# Patient Record
Sex: MALE | Race: WHITE | NOT HISPANIC OR LATINO
[De-identification: names, ages, dates, MRNs, and addresses within clinical notes are randomized per-mention and may not be internally consistent; named-entity substitution may affect disease eponyms.]

---

## 2018-09-04 PROBLEM — Z00.00 ENCOUNTER FOR PREVENTIVE HEALTH EXAMINATION: Status: ACTIVE | Noted: 2018-09-04

## 2018-10-17 ENCOUNTER — RX RENEWAL (OUTPATIENT)
Age: 53
End: 2018-10-17

## 2018-10-18 ENCOUNTER — RX RENEWAL (OUTPATIENT)
Age: 53
End: 2018-10-18

## 2018-12-07 ENCOUNTER — RECORD ABSTRACTING (OUTPATIENT)
Age: 53
End: 2018-12-07

## 2018-12-07 DIAGNOSIS — Z80.41 FAMILY HISTORY OF MALIGNANT NEOPLASM OF OVARY: ICD-10-CM

## 2018-12-07 DIAGNOSIS — Z83.49 FAMILY HISTORY OF OTHER ENDOCRINE, NUTRITIONAL AND METABOLIC DISEASES: ICD-10-CM

## 2018-12-07 DIAGNOSIS — Z83.3 FAMILY HISTORY OF DIABETES MELLITUS: ICD-10-CM

## 2018-12-07 DIAGNOSIS — Z80.0 FAMILY HISTORY OF MALIGNANT NEOPLASM OF DIGESTIVE ORGANS: ICD-10-CM

## 2018-12-07 DIAGNOSIS — Z78.9 OTHER SPECIFIED HEALTH STATUS: ICD-10-CM

## 2018-12-10 ENCOUNTER — APPOINTMENT (OUTPATIENT)
Dept: ENDOCRINOLOGY | Facility: CLINIC | Age: 53
End: 2018-12-10
Payer: COMMERCIAL

## 2018-12-10 VITALS
WEIGHT: 230 LBS | HEIGHT: 60 IN | BODY MASS INDEX: 45.16 KG/M2 | SYSTOLIC BLOOD PRESSURE: 124 MMHG | HEART RATE: 74 BPM | DIASTOLIC BLOOD PRESSURE: 82 MMHG

## 2018-12-10 DIAGNOSIS — Z86.79 PERSONAL HISTORY OF OTHER DISEASES OF THE CIRCULATORY SYSTEM: ICD-10-CM

## 2018-12-10 DIAGNOSIS — Z87.891 PERSONAL HISTORY OF NICOTINE DEPENDENCE: ICD-10-CM

## 2018-12-10 PROCEDURE — 99213 OFFICE O/P EST LOW 20 MIN: CPT

## 2018-12-10 RX ORDER — MULTIVITAMIN
TABLET ORAL DAILY
Refills: 0 | Status: ACTIVE | COMMUNITY

## 2019-02-05 ENCOUNTER — APPOINTMENT (OUTPATIENT)
Dept: ENDOCRINOLOGY | Facility: CLINIC | Age: 54
End: 2019-02-05

## 2019-06-10 ENCOUNTER — APPOINTMENT (OUTPATIENT)
Dept: ENDOCRINOLOGY | Facility: CLINIC | Age: 54
End: 2019-06-10
Payer: COMMERCIAL

## 2019-06-10 ENCOUNTER — TRANSCRIPTION ENCOUNTER (OUTPATIENT)
Age: 54
End: 2019-06-10

## 2019-06-10 VITALS
HEIGHT: 68 IN | OXYGEN SATURATION: 97 % | WEIGHT: 230 LBS | HEART RATE: 68 BPM | SYSTOLIC BLOOD PRESSURE: 122 MMHG | DIASTOLIC BLOOD PRESSURE: 80 MMHG | BODY MASS INDEX: 34.86 KG/M2

## 2019-06-10 PROCEDURE — 99213 OFFICE O/P EST LOW 20 MIN: CPT

## 2019-11-26 ENCOUNTER — APPOINTMENT (OUTPATIENT)
Dept: ENDOCRINOLOGY | Facility: CLINIC | Age: 54
End: 2019-11-26
Payer: COMMERCIAL

## 2019-11-26 PROCEDURE — 36415 COLL VENOUS BLD VENIPUNCTURE: CPT

## 2019-11-27 LAB
T4 FREE SERPL-MCNC: 1.5 NG/DL
TSH SERPL-ACNC: 3.47 UIU/ML

## 2019-12-02 LAB
THYROGLOB AB SERPL-ACNC: 47.4 IU/ML
THYROPEROXIDASE AB SERPL IA-ACNC: 3133 IU/ML

## 2019-12-03 ENCOUNTER — APPOINTMENT (OUTPATIENT)
Dept: ENDOCRINOLOGY | Facility: CLINIC | Age: 54
End: 2019-12-03
Payer: COMMERCIAL

## 2019-12-03 VITALS
DIASTOLIC BLOOD PRESSURE: 76 MMHG | SYSTOLIC BLOOD PRESSURE: 120 MMHG | HEIGHT: 68 IN | WEIGHT: 231 LBS | HEART RATE: 60 BPM | BODY MASS INDEX: 35.01 KG/M2

## 2019-12-03 PROCEDURE — 99213 OFFICE O/P EST LOW 20 MIN: CPT

## 2019-12-03 NOTE — REVIEW OF SYSTEMS
[Recent Weight Gain (___ Lbs)] : recent [unfilled] ~Ulb weight gain [Fatigue] : fatigue [As Noted in HPI] : as noted in HPI [Myalgia] : myalgia  [Palpitations] : no palpitations [Heart Rate Is Fast] : the heart rate was not fast [Constipation] : no constipation [Joint Pain] : no joint pain [Diarrhea] : no diarrhea [Tremors] : no tremors [Depression] : no depression [Anxiety] : no anxiety [Stress] : no stress [Cold Intolerance] : cold tolerant [Heat Intolerance] : heat tolerant

## 2019-12-03 NOTE — HISTORY OF PRESENT ILLNESS
[FreeTextEntry1] : Quality: Hashimoto's Hypothyroid and thyroid nodule\par Duration: 2014\par Onset: routine blood test and sonogram\par Severity: mild\par Associated symptoms: denies anterior neck pain, dysphagia or voice changes.\par \par MODIFYING FACTORS: \par Current thyroid medication: Synthroid (TAVIA) 150 mcg daily on empty stomach\par \par

## 2019-12-03 NOTE — DATA REVIEWED
[FreeTextEntry1] : Thyroid sono 2017 - diffusely heterogeneous thyroid with Right 9x5x6 mm solid nodule - stable since 2014\par Thyroid sono 11/22/19 - diffusely heterogeneous, normal size, no thyroid nodules\par

## 2019-12-03 NOTE — PHYSICAL EXAM
[Well Nourished] : well nourished [Alert] : alert [Well Developed] : well developed [Normal Sclera/Conjunctiva] : normal sclera/conjunctiva [EOMI] : extra ocular movement intact [No LAD] : no lymphadenopathy [Thyroid Not Enlarged] : the thyroid was not enlarged [No Thyroid Nodules] : there were no palpable thyroid nodules [Normal Rate] : heart rate was normal  [No Rash] : no rash [Normal S1, S2] : normal S1 and S2 [Normal Reflexes] : deep tendon reflexes were 2+ and symmetric [No Tremors] : no tremors [Normal Insight/Judgement] : insight and judgment were intact [Oriented x3] : oriented to person, place, and time [Normal Affect] : the affect was normal [Normal Mood] : the mood was normal

## 2019-12-03 NOTE — ASSESSMENT
[FreeTextEntry1] : 1. Hypothyroid - clinically and biochemically euthyroid\par -  cont current Synthroid dose\par - repeat TFTs 8 weeks to monitor TFTs as pt feels that thyrod levels may be trending in hypothyroid range\par \par 2. thyroid nodule -  thyroid nodule not seen on recent thyroid sonogram

## 2020-02-04 ENCOUNTER — APPOINTMENT (OUTPATIENT)
Dept: ENDOCRINOLOGY | Facility: CLINIC | Age: 55
End: 2020-02-04
Payer: COMMERCIAL

## 2020-02-04 PROCEDURE — 36415 COLL VENOUS BLD VENIPUNCTURE: CPT

## 2020-02-05 LAB
T4 FREE SERPL-MCNC: 1.1 NG/DL
TSH SERPL-ACNC: 5.71 UIU/ML

## 2020-05-18 ENCOUNTER — APPOINTMENT (OUTPATIENT)
Dept: ENDOCRINOLOGY | Facility: CLINIC | Age: 55
End: 2020-05-18

## 2020-06-01 ENCOUNTER — APPOINTMENT (OUTPATIENT)
Dept: ENDOCRINOLOGY | Facility: CLINIC | Age: 55
End: 2020-06-01
Payer: COMMERCIAL

## 2020-06-01 PROCEDURE — 36415 COLL VENOUS BLD VENIPUNCTURE: CPT

## 2020-06-02 ENCOUNTER — APPOINTMENT (OUTPATIENT)
Dept: ENDOCRINOLOGY | Facility: CLINIC | Age: 55
End: 2020-06-02
Payer: COMMERCIAL

## 2020-06-02 VITALS
HEART RATE: 54 BPM | HEIGHT: 68 IN | BODY MASS INDEX: 32.58 KG/M2 | SYSTOLIC BLOOD PRESSURE: 118 MMHG | WEIGHT: 215 LBS | DIASTOLIC BLOOD PRESSURE: 60 MMHG

## 2020-06-02 DIAGNOSIS — E04.1 NONTOXIC SINGLE THYROID NODULE: ICD-10-CM

## 2020-06-02 LAB
T4 FREE SERPL-MCNC: 1.7 NG/DL
TSH SERPL-ACNC: 1.22 UIU/ML

## 2020-06-02 PROCEDURE — 99213 OFFICE O/P EST LOW 20 MIN: CPT

## 2020-06-02 RX ORDER — CHLORHEXIDINE GLUCONATE 1.2 MG/ML
RINSE ORAL
Refills: 0 | Status: DISCONTINUED | COMMUNITY
End: 2020-06-02

## 2020-06-02 NOTE — HISTORY OF PRESENT ILLNESS
[FreeTextEntry1] : Quality: Hashimoto's Hypothyroid and thyroid nodule\par Duration: 2014\par Onset: routine blood test and sonogram\par Severity: mild\par Associated symptoms: denies anterior neck pain, dysphagia or voice changes.\par \par MODIFYING FACTORS: \par Current thyroid medication: Synthroid (TAVIA) 175 mcg daily on empty stomach, missed 3x since last visit\par \par

## 2020-06-02 NOTE — ASSESSMENT
[FreeTextEntry1] : 1. Hypothyroid - clinically and biochemically euthyroid\par -  cont Synthroid 175 mcg daily\par - repeat  TFTs 1 week before next visit\par \par \par

## 2020-06-02 NOTE — REVIEW OF SYSTEMS
[Constipation] : no constipation [Palpitations] : no palpitations [Diarrhea] : no diarrhea [Tremors] : no tremors [Heat Intolerance] : no heat intolerance [Cold Intolerance] : no cold intolerance

## 2020-06-02 NOTE — PHYSICAL EXAM
[Well Nourished] : well nourished [Alert] : alert [Healthy Appearance] : healthy appearance [No Acute Distress] : no acute distress [Normal Sclera/Conjunctiva] : normal sclera/conjunctiva [Normal Voice/Communication] : normal voice communication [EOMI] : extra ocular movement intact [No LAD] : no lymphadenopathy [No Thyroid Nodules] : no palpable thyroid nodules [No Respiratory Distress] : no respiratory distress [No Accessory Muscle Use] : no accessory muscle use [Clear to Auscultation] : lungs were clear to auscultation bilaterally [Normal S1, S2] : normal S1 and S2 [Normal Gait] : normal gait [Oriented x3] : oriented to person, place, and time [No Tremors] : no tremors [Normal Mood] : the mood was normal [Normal Insight/Judgement] : insight and judgment were intact [Normal Affect] : the affect was normal

## 2020-11-25 ENCOUNTER — APPOINTMENT (OUTPATIENT)
Dept: ENDOCRINOLOGY | Facility: CLINIC | Age: 55
End: 2020-11-25

## 2020-12-02 ENCOUNTER — APPOINTMENT (OUTPATIENT)
Dept: ENDOCRINOLOGY | Facility: CLINIC | Age: 55
End: 2020-12-02
Payer: COMMERCIAL

## 2020-12-02 VITALS
BODY MASS INDEX: 31.83 KG/M2 | SYSTOLIC BLOOD PRESSURE: 110 MMHG | OXYGEN SATURATION: 98 % | HEIGHT: 68 IN | WEIGHT: 210 LBS | DIASTOLIC BLOOD PRESSURE: 70 MMHG | HEART RATE: 58 BPM

## 2020-12-02 PROCEDURE — 99213 OFFICE O/P EST LOW 20 MIN: CPT

## 2020-12-02 PROCEDURE — 99072 ADDL SUPL MATRL&STAF TM PHE: CPT

## 2020-12-02 NOTE — ASSESSMENT
[FreeTextEntry1] : 1. Hypothyroid - clinically euthyroid\par -  cont Synthroid 175 mcg daily\par - repeat  TFTs this week and 1 week before next visit; will adjust synthroid dose if needed\par \par \par

## 2020-12-02 NOTE — PHYSICAL EXAM
[Alert] : alert [Well Nourished] : well nourished [Healthy Appearance] : healthy appearance [No Acute Distress] : no acute distress [Normal Voice/Communication] : normal voice communication [Normal Sclera/Conjunctiva] : normal sclera/conjunctiva [EOMI] : extra ocular movement intact [No LAD] : no lymphadenopathy [No Thyroid Nodules] : no palpable thyroid nodules [No Respiratory Distress] : no respiratory distress [No Accessory Muscle Use] : no accessory muscle use [Clear to Auscultation] : lungs were clear to auscultation bilaterally [Normal S1, S2] : normal S1 and S2 [Normal Gait] : normal gait [No Tremors] : no tremors [Oriented x3] : oriented to person, place, and time [Normal Affect] : the affect was normal [Normal Insight/Judgement] : insight and judgment were intact [Normal Mood] : the mood was normal [Normal Rate] : heart rate was normal [Normal Reflexes] : deep tendon reflexes were 2+ and symmetric [de-identified] : firm palpable thyroid

## 2020-12-02 NOTE — HISTORY OF PRESENT ILLNESS
[FreeTextEntry1] : Interval Hx - blood in urine 9/2020 dx w enlarged prostate and possible UTI.\par \par Quality: Hashimoto's Hypothyroid and thyroid nodule\par Duration: 2014\par Onset: routine blood test and sonogram\par Severity: mild\par Associated symptoms: denies anterior neck pain, dysphagia or voice changes.\par \par MODIFYING FACTORS: \par Current thyroid medication: Synthroid (TAVIA) 175 mcg daily on empty stomach, may have missed 1-2 doses since last visit\par \par

## 2020-12-02 NOTE — REVIEW OF SYSTEMS
[As Noted in HPI] : as noted in HPI [Recent Weight Gain (___ Lbs)] : no recent weight gain [Recent Weight Loss (___ Lbs)] : no recent weight loss [Palpitations] : no palpitations [Shortness Of Breath] : no shortness of breath [Constipation] : no constipation [Diarrhea] : no diarrhea [Myalgia] : no myalgia  [Tremors] : no tremors [Depression] : no depression [Anxiety] : no anxiety [Cold Intolerance] : no cold intolerance [Heat Intolerance] : no heat intolerance

## 2020-12-23 ENCOUNTER — RX RENEWAL (OUTPATIENT)
Age: 55
End: 2020-12-23

## 2021-01-13 ENCOUNTER — TRANSCRIPTION ENCOUNTER (OUTPATIENT)
Age: 56
End: 2021-01-13

## 2021-06-01 ENCOUNTER — NON-APPOINTMENT (OUTPATIENT)
Age: 56
End: 2021-06-01

## 2021-06-02 ENCOUNTER — APPOINTMENT (OUTPATIENT)
Dept: ENDOCRINOLOGY | Facility: CLINIC | Age: 56
End: 2021-06-02
Payer: COMMERCIAL

## 2021-06-02 VITALS
WEIGHT: 217 LBS | DIASTOLIC BLOOD PRESSURE: 82 MMHG | HEART RATE: 80 BPM | SYSTOLIC BLOOD PRESSURE: 120 MMHG | HEIGHT: 68 IN | BODY MASS INDEX: 32.89 KG/M2 | OXYGEN SATURATION: 97 %

## 2021-06-02 PROCEDURE — 99213 OFFICE O/P EST LOW 20 MIN: CPT

## 2021-06-02 PROCEDURE — 99072 ADDL SUPL MATRL&STAF TM PHE: CPT

## 2021-06-02 NOTE — HISTORY OF PRESENT ILLNESS
[FreeTextEntry1] : Interval Hx - dx w enlarged prostate and elevated PSA - to follow w urology\par \par Quality: Hashimoto's Hypothyroid and thyroid nodule\par Duration: 2014\par Onset: routine blood test and sonogram\par Severity: mild\par Associated symptoms: denies anterior neck pain, dysphagia or voice changes.\par \par MODIFYING FACTORS: improved w meds\par Current thyroid medication: Synthroid (TAVIA) 175 mcg daily on empty stomach, may have missed 1-2 doses since last visit\par \par

## 2021-06-02 NOTE — PHYSICAL EXAM
[Alert] : alert [Well Nourished] : well nourished [Healthy Appearance] : healthy appearance [No Acute Distress] : no acute distress [Normal Voice/Communication] : normal voice communication [Normal Sclera/Conjunctiva] : normal sclera/conjunctiva [EOMI] : extra ocular movement intact [No LAD] : no lymphadenopathy [No Thyroid Nodules] : no palpable thyroid nodules [No Accessory Muscle Use] : no accessory muscle use [Clear to Auscultation] : lungs were clear to auscultation bilaterally [Normal S1, S2] : normal S1 and S2 [Normal Rate] : heart rate was normal [Normal Gait] : normal gait [Normal Reflexes] : deep tendon reflexes were 2+ and symmetric [No Tremors] : no tremors [Oriented x3] : oriented to person, place, and time [Normal Affect] : the affect was normal [Normal Insight/Judgement] : insight and judgment were intact [Normal Mood] : the mood was normal [de-identified] : firm palpable thyroid

## 2021-06-02 NOTE — ASSESSMENT
[FreeTextEntry1] : 1. Hypothyroid - clinically euthyroid but TSH low normal\par -  decrease Synthroid 175 mcg 1 tab Sun-Fri. 1/2 tab Sat daily\par - repeat  TFTs in 8 weeks and 1 week before next visit; will adjust synthroid dose if needed\par \par \par

## 2021-06-02 NOTE — REVIEW OF SYSTEMS
[As Noted in HPI] : as noted in HPI [Recent Weight Gain (___ Lbs)] : no recent weight gain [Recent Weight Loss (___ Lbs)] : no recent weight loss [Palpitations] : no palpitations [Fast Heart Rate] : heart rate is not fast [Constipation] : no constipation [Diarrhea] : no diarrhea [Myalgia] : no myalgia  [Tremors] : no tremors [Anxiety] : no anxiety [Cold Intolerance] : no cold intolerance [Heat Intolerance] : no heat intolerance [FreeTextEntry2] : weight up/down

## 2021-06-02 NOTE — DATA REVIEWED
[FreeTextEntry1] : Thyroid sono 2017 - diffusely heterogeneous thyroid with Right 9x5x6 mm solid nodule - stable since 2014\par Thyroid sono 11/22/19 - diffusely heterogeneous, normal size, no thyroid nodules\par ========================================================\par Labs 5/6/21 Ft4 1.5, TSh 0.54\par

## 2021-08-16 ENCOUNTER — TRANSCRIPTION ENCOUNTER (OUTPATIENT)
Age: 56
End: 2021-08-16

## 2021-10-06 ENCOUNTER — APPOINTMENT (OUTPATIENT)
Dept: ENDOCRINOLOGY | Facility: CLINIC | Age: 56
End: 2021-10-06
Payer: COMMERCIAL

## 2021-10-06 VITALS
OXYGEN SATURATION: 99 % | WEIGHT: 216 LBS | HEART RATE: 58 BPM | HEIGHT: 68 IN | BODY MASS INDEX: 32.74 KG/M2 | DIASTOLIC BLOOD PRESSURE: 70 MMHG | SYSTOLIC BLOOD PRESSURE: 110 MMHG

## 2021-10-06 DIAGNOSIS — N40.0 BENIGN PROSTATIC HYPERPLASIA WITHOUT LOWER URINARY TRACT SYMPMS: ICD-10-CM

## 2021-10-06 PROCEDURE — 99213 OFFICE O/P EST LOW 20 MIN: CPT

## 2021-10-06 NOTE — PHYSICAL EXAM
[Alert] : alert [Well Nourished] : well nourished [Healthy Appearance] : healthy appearance [No Acute Distress] : no acute distress [Normal Voice/Communication] : normal voice communication [Normal Sclera/Conjunctiva] : normal sclera/conjunctiva [EOMI] : extra ocular movement intact [No LAD] : no lymphadenopathy [No Thyroid Nodules] : no palpable thyroid nodules [No Accessory Muscle Use] : no accessory muscle use [Clear to Auscultation] : lungs were clear to auscultation bilaterally [Normal S1, S2] : normal S1 and S2 [Normal Rate] : heart rate was normal [Normal Gait] : normal gait [Normal Reflexes] : deep tendon reflexes were 2+ and symmetric [No Tremors] : no tremors [Oriented x3] : oriented to person, place, and time [Normal Affect] : the affect was normal [Normal Insight/Judgement] : insight and judgment were intact [Normal Mood] : the mood was normal [de-identified] : firm palpable thyroid

## 2021-10-06 NOTE — DATA REVIEWED
[FreeTextEntry1] : Thyroid sono 2017 - diffusely heterogeneous thyroid with Right 9x5x6 mm solid nodule - stable since 2014\par Thyroid sono 11/22/19 - diffusely heterogeneous, normal size, no thyroid nodules\par ========================================================\par Labs 5/6/21 Ft4 1.5, TSh 0.54\par Labs 9/30/21 Ft4 1.5, TSH not done\par

## 2021-10-06 NOTE — REVIEW OF SYSTEMS
[Fatigue] : fatigue [Recent Weight Gain (___ Lbs)] : no recent weight gain [Recent Weight Loss (___ Lbs)] : no recent weight loss [As Noted in HPI] : as noted in HPI [Palpitations] : no palpitations [Fast Heart Rate] : heart rate is not fast [Constipation] : no constipation [Diarrhea] : no diarrhea [Myalgia] : no myalgia  [Tremors] : no tremors [Anxiety] : no anxiety [Cold Intolerance] : no cold intolerance [Heat Intolerance] : no heat intolerance [FreeTextEntry2] : weight up/down

## 2021-10-06 NOTE — HISTORY OF PRESENT ILLNESS
[FreeTextEntry1] : Interval Hx - s/p prostate biopsy - benign per pt\par \par Quality: Hashimoto's Hypothyroid and thyroid nodule\par Duration: 2014\par Onset: routine blood test and sonogram\par Severity: mild\par Associated symptoms: denies anterior neck pain, dysphagia or voice changes.\par \par MODIFYING FACTORS: improved w meds\par Current thyroid medication: Synthroid (TAVIA) 150 mcg 1 tab daily\par \par

## 2021-10-06 NOTE — ASSESSMENT
[FreeTextEntry1] : 1. Hypothyroid - clinically euthyroid, normal Ft4 but lab did not do TSH level\par -  cont Synthroid (TAVIA) 150 mcg 1 tab daily\par - add on TSH to recent labs\par - repeat  TFTs  1 week before next visit\par \par \par

## 2021-10-07 ENCOUNTER — NON-APPOINTMENT (OUTPATIENT)
Age: 56
End: 2021-10-07

## 2022-02-01 ENCOUNTER — APPOINTMENT (OUTPATIENT)
Dept: ENDOCRINOLOGY | Facility: CLINIC | Age: 57
End: 2022-02-01
Payer: COMMERCIAL

## 2022-02-01 VITALS
HEIGHT: 68 IN | SYSTOLIC BLOOD PRESSURE: 120 MMHG | WEIGHT: 219 LBS | OXYGEN SATURATION: 98 % | HEART RATE: 60 BPM | DIASTOLIC BLOOD PRESSURE: 80 MMHG | BODY MASS INDEX: 33.19 KG/M2

## 2022-02-01 PROCEDURE — 99214 OFFICE O/P EST MOD 30 MIN: CPT

## 2022-02-01 NOTE — PHYSICAL EXAM
[Alert] : alert [Healthy Appearance] : healthy appearance [EOMI] : extra ocular movement intact [No LAD] : no lymphadenopathy [No Thyroid Nodules] : no palpable thyroid nodules [Normal S1, S2] : normal S1 and S2 [Normal Rate] : heart rate was normal [No Tremors] : no tremors [Oriented x3] : oriented to person, place, and time [Normal Affect] : the affect was normal [Normal Insight/Judgement] : insight and judgment were intact [Normal Mood] : the mood was normal

## 2022-02-01 NOTE — ASSESSMENT
[FreeTextEntry1] : 1. Hypothyroid - clinically euthyroid, normal TSh and Ft4 levels\par -  cont Synthroid (TAVIA) 137 mcg 1 tab daily, rx sent\par - repeat  TFTs  1 week before next visit\par \par We discussed CDC guidelines regarding the COVID vaccine and booster.  We discussed that the potential benefits of the vaccine outweigh the risks and since illness from COVID19 poses a greater and more likely threat than potential vaccine side effects, it would be in his/her best interests to get the vaccine.  The patient verbalized understanding..\par \par \par \par

## 2022-02-01 NOTE — DATA REVIEWED
[FreeTextEntry1] : Thyroid sono 2017 - diffusely heterogeneous thyroid with Right 9x5x6 mm solid nodule - stable since 2014\par Thyroid sono 11/22/19 - diffusely heterogeneous, normal size, no thyroid nodules\par ========================================================\par Labs 1/26/22 TSH 0.98, Ft4 1.3\par

## 2022-02-01 NOTE — REVIEW OF SYSTEMS
[Recent Weight Gain (___ Lbs)] : recent weight gain: [unfilled] lbs [As Noted in HPI] : as noted in HPI [Palpitations] : no palpitations [Constipation] : no constipation [Diarrhea] : no diarrhea [Tremors] : no tremors

## 2022-02-01 NOTE — HISTORY OF PRESENT ILLNESS
[FreeTextEntry1] : Interval Hx - no issues, no changes. \par \par Quality: Hashimoto's Hypothyroid and thyroid nodule\par Duration: 2014\par Onset: routine blood test and sonogram\par Severity: mild\par Associated symptoms: denies anterior neck pain, dysphagia or voice changes.\par \par MODIFYING FACTORS: improved w meds\par Current thyroid medication: Synthroid (TAVIA) 137 mcg 1 tab daily\par \par

## 2022-06-30 ENCOUNTER — RX RENEWAL (OUTPATIENT)
Age: 57
End: 2022-06-30

## 2022-06-30 RX ORDER — LEVOTHYROXINE SODIUM 137 UG/1
137 TABLET ORAL
Qty: 90 | Refills: 1 | Status: ACTIVE | COMMUNITY
Start: 2018-10-18 | End: 1900-01-01

## 2022-08-02 ENCOUNTER — APPOINTMENT (OUTPATIENT)
Dept: ENDOCRINOLOGY | Facility: CLINIC | Age: 57
End: 2022-08-02

## 2022-08-02 VITALS
DIASTOLIC BLOOD PRESSURE: 76 MMHG | HEIGHT: 68 IN | HEART RATE: 52 BPM | WEIGHT: 223 LBS | SYSTOLIC BLOOD PRESSURE: 122 MMHG | BODY MASS INDEX: 33.8 KG/M2 | OXYGEN SATURATION: 98 %

## 2022-08-02 DIAGNOSIS — E06.3 OTHER SPECIFIED HYPOTHYROIDISM: ICD-10-CM

## 2022-08-02 DIAGNOSIS — E03.8 OTHER SPECIFIED HYPOTHYROIDISM: ICD-10-CM

## 2022-08-02 PROCEDURE — 99214 OFFICE O/P EST MOD 30 MIN: CPT

## 2022-08-02 RX ORDER — FINASTERIDE 5 MG/1
5 TABLET, FILM COATED ORAL
Qty: 30 | Refills: 0 | Status: ACTIVE | COMMUNITY
Start: 2022-05-18

## 2022-08-02 NOTE — REVIEW OF SYSTEMS
[As Noted in HPI] : as noted in HPI [Dysphagia] : no dysphagia [Neck Pain] : no neck pain [Dysphonia] : no dysphonia [Constipation] : no constipation [Diarrhea] : no diarrhea [Myalgia] : no myalgia  [Cold Intolerance] : no cold intolerance

## 2022-08-02 NOTE — HISTORY OF PRESENT ILLNESS
[FreeTextEntry1] : Interval Hx - 1/2022 had covid, (+) weight gain and hard time losing weight, benign prostate biopsy and may need TURP\par \par Quality: Hashimoto's Hypothyroid and thyroid nodule\par Duration: 2014\par Onset: routine blood test and sonogram\par Severity: mild\par Associated symptoms: denies anterior neck pain, dysphagia or voice changes.\par \par MODIFYING FACTORS: improved w meds\par Current thyroid medication: Synthroid (TAVIA) 137 mcg 1 tab daily with coffee (may have missed a few days) (has been on 150 mcg daily and 175 mcg daily which resulted in low TSH level)\par \par

## 2022-08-02 NOTE — ASSESSMENT
[FreeTextEntry1] : 1. Hypothyroid - clinically euthyroid,  slight TSH elevation due to missing Lt4 dosease\par -  cont Synthroid (TAVIA) 137 mcg 1 tab daily\par - counseled pt on LT4 adherence and ways to improve adherence, repeat TFTs 8 weeks \par - take LT4 daily on empty stomach with water only, separate from pills/food/vitamins by at least 1 hour\par \par \par \par \par

## 2022-08-02 NOTE — PHYSICAL EXAM
[Alert] : alert [No Acute Distress] : no acute distress [EOMI] : extra ocular movement intact [No LAD] : no lymphadenopathy [No Thyroid Nodules] : no palpable thyroid nodules [No Accessory Muscle Use] : no accessory muscle use [Clear to Auscultation] : lungs were clear to auscultation bilaterally [Normal S1, S2] : normal S1 and S2 [Normal Rate] : heart rate was normal [No Edema] : no peripheral edema [No Tremors] : no tremors [Oriented x3] : oriented to person, place, and time [Normal Affect] : the affect was normal [Normal Insight/Judgement] : insight and judgment were intact [Normal Mood] : the mood was normal

## 2022-08-02 NOTE — DATA REVIEWED
[FreeTextEntry1] : Thyroid sono 2017 - diffusely heterogeneous thyroid with Right 9x5x6 mm solid nodule - stable since 2014\par Thyroid sono 11/22/19 - diffusely heterogeneous, normal size, no thyroid nodules\par ========================================================\par Labs 7/27/22 TSH 4.74 FT4 1.3\par

## 2022-12-10 ENCOUNTER — APPOINTMENT (OUTPATIENT)
Dept: ENDOCRINOLOGY | Facility: CLINIC | Age: 57
End: 2022-12-10

## 2023-02-14 ENCOUNTER — RX RENEWAL (OUTPATIENT)
Age: 58
End: 2023-02-14